# Patient Record
Sex: FEMALE | Race: WHITE | Employment: OTHER | URBAN - METROPOLITAN AREA
[De-identification: names, ages, dates, MRNs, and addresses within clinical notes are randomized per-mention and may not be internally consistent; named-entity substitution may affect disease eponyms.]

---

## 2017-04-04 ENCOUNTER — HOSPITAL ENCOUNTER (OUTPATIENT)
Dept: GENERAL RADIOLOGY | Age: 82
Discharge: HOME | End: 2017-04-04
Payer: MEDICARE

## 2017-04-04 DIAGNOSIS — J44.9 OBSTRUCTIVE CHRONIC BRONCHITIS WITHOUT EXACERBATION (HCC): ICD-10-CM

## 2017-04-04 PROCEDURE — 71020 XR CHEST PA LAT: CPT

## 2017-11-11 ENCOUNTER — HOSPITAL ENCOUNTER (EMERGENCY)
Age: 82
Discharge: HOME | End: 2017-11-11
Attending: EMERGENCY MEDICINE
Payer: MEDICARE

## 2017-11-11 ENCOUNTER — APPOINTMENT (OUTPATIENT)
Dept: GENERAL RADIOLOGY | Age: 82
End: 2017-11-11
Attending: EMERGENCY MEDICINE
Payer: MEDICARE

## 2017-11-11 VITALS
RESPIRATION RATE: 22 BRPM | DIASTOLIC BLOOD PRESSURE: 76 MMHG | OXYGEN SATURATION: 93 % | HEIGHT: 67 IN | SYSTOLIC BLOOD PRESSURE: 113 MMHG | BODY MASS INDEX: 28.56 KG/M2 | WEIGHT: 182 LBS | HEART RATE: 83 BPM | TEMPERATURE: 98.2 F

## 2017-11-11 DIAGNOSIS — I50.43 ACUTE ON CHRONIC COMBINED SYSTOLIC AND DIASTOLIC CONGESTIVE HEART FAILURE (HCC): Primary | ICD-10-CM

## 2017-11-11 DIAGNOSIS — J44.9 CHRONIC OBSTRUCTIVE PULMONARY DISEASE, UNSPECIFIED COPD TYPE (HCC): ICD-10-CM

## 2017-11-11 LAB
ANION GAP SERPL CALC-SCNC: 10 MMOL/L (ref 3–18)
BASOPHILS # BLD: 0 K/UL (ref 0–0.06)
BASOPHILS NFR BLD: 0 % (ref 0–2)
BNP SERPL-MCNC: 2969 PG/ML (ref 0–1800)
BUN SERPL-MCNC: 24 MG/DL (ref 7–18)
BUN/CREAT SERPL: 28 (ref 12–20)
CALCIUM SERPL-MCNC: 8.9 MG/DL (ref 8.5–10.1)
CHLORIDE SERPL-SCNC: 105 MMOL/L (ref 100–108)
CK MB CFR SERPL CALC: 2.2 % (ref 0–4)
CK MB SERPL-MCNC: 1.5 NG/ML (ref 5–25)
CK SERPL-CCNC: 67 U/L (ref 26–192)
CO2 SERPL-SCNC: 28 MMOL/L (ref 21–32)
CREAT SERPL-MCNC: 0.86 MG/DL (ref 0.6–1.3)
DIFFERENTIAL METHOD BLD: ABNORMAL
EOSINOPHIL # BLD: 0 K/UL (ref 0–0.4)
EOSINOPHIL NFR BLD: 0 % (ref 0–5)
ERYTHROCYTE [DISTWIDTH] IN BLOOD BY AUTOMATED COUNT: 14.7 % (ref 11.6–14.5)
GLUCOSE SERPL-MCNC: 100 MG/DL (ref 74–99)
HCT VFR BLD AUTO: 37.2 % (ref 35–45)
HGB BLD-MCNC: 12.1 G/DL (ref 12–16)
LYMPHOCYTES # BLD: 2.5 K/UL (ref 0.9–3.6)
LYMPHOCYTES NFR BLD: 25 % (ref 21–52)
MCH RBC QN AUTO: 28.7 PG (ref 24–34)
MCHC RBC AUTO-ENTMCNC: 32.5 G/DL (ref 31–37)
MCV RBC AUTO: 88.4 FL (ref 74–97)
MONOCYTES # BLD: 0.8 K/UL (ref 0.05–1.2)
MONOCYTES NFR BLD: 8 % (ref 3–10)
NEUTS SEG # BLD: 6.8 K/UL (ref 1.8–8)
NEUTS SEG NFR BLD: 67 % (ref 40–73)
PLATELET # BLD AUTO: 249 K/UL (ref 135–420)
PMV BLD AUTO: 10.8 FL (ref 9.2–11.8)
POTASSIUM SERPL-SCNC: 4.1 MMOL/L (ref 3.5–5.5)
RBC # BLD AUTO: 4.21 M/UL (ref 4.2–5.3)
SODIUM SERPL-SCNC: 143 MMOL/L (ref 136–145)
TROPONIN I SERPL-MCNC: <0.02 NG/ML (ref 0–0.04)
WBC # BLD AUTO: 10 K/UL (ref 4.6–13.2)

## 2017-11-11 PROCEDURE — 77030029684 HC NEB SM VOL KT MONA -A

## 2017-11-11 PROCEDURE — 71010 XR CHEST PORT: CPT

## 2017-11-11 PROCEDURE — 85025 COMPLETE CBC W/AUTO DIFF WBC: CPT | Performed by: EMERGENCY MEDICINE

## 2017-11-11 PROCEDURE — 74011000250 HC RX REV CODE- 250: Performed by: EMERGENCY MEDICINE

## 2017-11-11 PROCEDURE — 94640 AIRWAY INHALATION TREATMENT: CPT

## 2017-11-11 PROCEDURE — 82550 ASSAY OF CK (CPK): CPT | Performed by: EMERGENCY MEDICINE

## 2017-11-11 PROCEDURE — 93005 ELECTROCARDIOGRAM TRACING: CPT

## 2017-11-11 PROCEDURE — 83880 ASSAY OF NATRIURETIC PEPTIDE: CPT | Performed by: EMERGENCY MEDICINE

## 2017-11-11 PROCEDURE — 80048 BASIC METABOLIC PNL TOTAL CA: CPT | Performed by: EMERGENCY MEDICINE

## 2017-11-11 PROCEDURE — 99285 EMERGENCY DEPT VISIT HI MDM: CPT

## 2017-11-11 RX ORDER — CHLORPHENIRAMINE MALEATE 4 MG
4 TABLET ORAL 2 TIMES DAILY
COMMUNITY

## 2017-11-11 RX ORDER — ALBUTEROL SULFATE 0.83 MG/ML
5 SOLUTION RESPIRATORY (INHALATION)
Status: COMPLETED | OUTPATIENT
Start: 2017-11-11 | End: 2017-11-11

## 2017-11-11 RX ORDER — FUROSEMIDE 10 MG/ML
40 INJECTION INTRAMUSCULAR; INTRAVENOUS
Status: DISCONTINUED | OUTPATIENT
Start: 2017-11-11 | End: 2017-11-11 | Stop reason: HOSPADM

## 2017-11-11 RX ORDER — FLUTICASONE PROPIONATE AND SALMETEROL 113; 14 UG/1; UG/1
POWDER, METERED RESPIRATORY (INHALATION)
COMMUNITY

## 2017-11-11 RX ORDER — ALBUTEROL SULFATE 0.83 MG/ML
2.5 SOLUTION RESPIRATORY (INHALATION)
Status: DISCONTINUED | OUTPATIENT
Start: 2017-11-11 | End: 2017-11-11 | Stop reason: HOSPADM

## 2017-11-11 RX ADMIN — ALBUTEROL SULFATE 5 MG: 2.5 SOLUTION RESPIRATORY (INHALATION) at 04:51

## 2017-11-11 NOTE — ED PROVIDER NOTES
HPI Comments: Patient with a history of COPD chronic hypoxemia, A. [facility], [facility] with recent AICD placement, heart failure with an EF of 50%, AAA brought in by EMS with difficulty breathing. Patient states that she got up to use the bathroom started wheezing, tried her new nebulizer treatment which did not help at all. States that she had a pacemaker generator change on Monday she was intubated at that time and has been wheezing since then. Also reports that she is sleeping on 3 pillows at night compliant with her diuretic. She denies any fever not coughing up any purulent sputum no abdominal pain or other complaints. She is currently on day 2 of a steroid taper. Patient is a 80 y.o. female presenting with shortness of breath. Shortness of Breath   Associated symptoms include wheezing. Pertinent negatives include no fever, no headaches, no chest pain and no rash.         Past Medical History:   Diagnosis Date    A-fib [facility])     AAA (abdominal aortic aneurysm) (HCC)     Arthropathy     Asthma     Back pain     Biventricular ICD (implantable cardioverter-defibrillator) in place     CAD (coronary artery disease)     Cardiac arrhythmia     Chronic low back pain     Congestive heart failure (HCC)     Constipation     COPD (chronic obstructive pulmonary disease) (HCC)     Coronary artery disease     Current smoker     DJD (degenerative joint disease)     Endometrial hyperplasia     Fibromyalgia     Heart failure (HCC)     Hypertension     Irritable bowel syndrome     LBBB (left bundle branch block)     Medication intolerance     Microscopic hematuria     Multiple renal cysts     Muscle atrophy     Myocardial infarct     Non-ischemic cardiomyopathy (HCC)     Obesity     Persistent atrial fibrillation (HCC)     Pregnant state, incidental     Renal cyst, left     Renal disorder     Transplant     Umbilical hernia without mention of obstruction or gangrene     Vision decreased        Past Surgical History:   Procedure Laterality Date    CARDIAC SURG PROCEDURE UNLIST      HX BREAST LUMPECTOMY      HX CATARACT REMOVAL Left     HX HERNIA REPAIR  2013    LAPAROSCOPIC    HX HIP REPLACEMENT Left     HX HYSTERECTOMY  1965    HX OTHER SURGICAL  01/22/2016    CARDIOVERSION    HX PACEMAKER      pacemaker/defibrillator    HX PACEMAKER      HX TONSILLECTOMY  1946    HX VEIN STRIPPING           Family History:   Problem Relation Age of Onset    Adopted: Yes    Heart Failure Mother     Hypertension Other     Heart Failure Sister        Social History     Social History    Marital status:      Spouse name: N/A    Number of children: N/A    Years of education: N/A     Occupational History    Not on file. Social History Main Topics    Smoking status: Former Smoker     Packs/day: 0.20     Years: 45.00     Types: Cigarettes    Smokeless tobacco: Never Used      Comment: Smokes 2 cigarettes per day    Alcohol use No    Drug use: No    Sexual activity: Not on file     Other Topics Concern    Not on file     Social History Narrative    ** Merged History Encounter **              ALLERGIES: Adenosine; Adenosine; Amiodarone; Benzonatate; Contrast agent [iodine]; Coreg [carvedilol]; Multaq [dronedarone]; Tape [adhesive]; and Tudorza pressair [aclidinium bromide]    Review of Systems   Constitutional: Negative for fever. HENT: Negative for nosebleeds. Eyes: Negative for visual disturbance. Respiratory: Positive for shortness of breath and wheezing. Cardiovascular: Negative for chest pain. Gastrointestinal: Negative for blood in stool. Genitourinary: Negative for dysuria. Musculoskeletal: Negative for myalgias. Skin: Negative for rash. Neurological: Negative for headaches. All other systems reviewed and are negative.       Vitals:    11/11/17 0445 11/11/17 0451 11/11/17 0500 11/11/17 0515   BP: 119/61 119/60 105/77 124/66   Pulse: 95 88 97 90   Resp: 27  22 24   Temp: SpO2: 96%  98% 100%   Weight:       Height:                Physical Exam   Constitutional:     General:  Well-developed, well-nourished, not warm to touch nontoxic. Head:  Normocephalic atraumatic. Eyes:  Pupils midrange extraocular movements intact. No pallor or conjunctival injection. Nose:  No rhinorrhea, inspection grossly normal.    Ears:  Grossly normal to inspection, no discharge. Mouth:  Mucous membranes moist, no appreciable intraoral lesion. Neck/Back:  Trachea midline, no asymmetry. No JVD  Chest:  Grossly normal inspection, symmetric chest rise. left pacer pocket with dressing C/D/I, no erythema or pain on palp. Pulmonary: Expiratory wheezing all lung fields mildly prolonged expiratory phase speaking in full sentences. Cardiovascular:  S1-S2 no murmurs rubs or gallops. Abdomen: Soft, nontender, nondistended no guarding rebound or peritoneal signs. Extremities:  Grossly normal to inspection, peripheral pulses intact  1+ pitting edema symmetric bilaterally no overlying skin changes  Neurologic:  Alert and oriented no appreciable focal neurologic deficit. Skin:  Warm and dry  Psychiatric:  Grossly normal mood and affect. Nursing note reviewed, vital signs reviewed. MDM  Number of Diagnoses or Management Options  Acute on chronic combined systolic and diastolic congestive heart failure ([address]):   Chronic obstructive pulmonary disease, unspecified COPD type [facility]):   Diagnosis management comments: ED course:  Patient presents with shortness of breath seen upon arrival placed on a cardiac monitor narrow complex rhythm, blood pressure normal, saturation 96% on room air placed on nasal cannula for comfort, continue breathing treatments here, rule out ACS and evaluated for the, stated heart failure.     Chest x-ray per my interpretation no clear infiltrate or effusion, largely unchanged compared to prior    EKG done at 0525 hrs.: Ventricular paced rhythm heart rate 98 intervals are significant for a prolonged QRS duration 154, prolonged QTC and no suspicious ST changes    Labs: Troponin negative, BNP elevated, chemistry largely unremarkable    Patient reevaluated after breathing treatments, still persistently wheezing    Plan was to administer Lasix here diuresis and decide inpatient versus outpatient, and she states that she would rather go home and take Lasix. We'll give Lasix 40 mg twice a day ×3 days follow up with PCP for reevaluation    Patient's presentation, history, physical exam and laboratory evaluations were reviewed. At this time patient was felt to be stable for outpatient management and follow with primary care/specialist.  Patient was instructed to return to the emergency department with any concerns. Disposition:    Discharged home      Portions of this chart were created with Dragon medical speech to text program.   Unrecognized errors may be present.       ED Course       Procedures

## 2017-11-11 NOTE — ED TRIAGE NOTES
Patient awoke this AM went to restroom and became SOB. Attempted home neb without relief. EMS administered 125mg Solumedrol, 2gm Mag Sulfate, Duoneb x1 and albuterol x1.

## 2017-11-12 LAB
ATRIAL RATE: 96 BPM
CALCULATED R AXIS, ECG10: -96 DEGREES
CALCULATED T AXIS, ECG11: 74 DEGREES
DIAGNOSIS, 93000: NORMAL
Q-T INTERVAL, ECG07: 420 MS
QRS DURATION, ECG06: 154 MS
QTC CALCULATION (BEZET), ECG08: 536 MS
VENTRICULAR RATE, ECG03: 98 BPM